# Patient Record
Sex: FEMALE | Race: WHITE | Employment: FULL TIME | URBAN - METROPOLITAN AREA
[De-identification: names, ages, dates, MRNs, and addresses within clinical notes are randomized per-mention and may not be internally consistent; named-entity substitution may affect disease eponyms.]

---

## 2023-04-01 ENCOUNTER — ANESTHESIA EVENT (OUTPATIENT)
Dept: LABOR AND DELIVERY | Age: 33
End: 2023-04-01
Payer: COMMERCIAL

## 2023-04-01 ENCOUNTER — ANESTHESIA (OUTPATIENT)
Dept: LABOR AND DELIVERY | Age: 33
End: 2023-04-01
Payer: COMMERCIAL

## 2023-04-01 ENCOUNTER — HOSPITAL ENCOUNTER (INPATIENT)
Age: 33
LOS: 3 days | Discharge: HOME | End: 2023-04-04
Attending: LEGAL MEDICINE | Admitting: LEGAL MEDICINE
Payer: COMMERCIAL

## 2023-04-01 ENCOUNTER — APPOINTMENT (OUTPATIENT)
Dept: LABOR AND DELIVERY | Age: 33
End: 2023-04-01
Payer: COMMERCIAL

## 2023-04-01 PROBLEM — Z34.93 NORMAL PREGNANCY, THIRD TRIMESTER: Status: ACTIVE | Noted: 2023-04-01

## 2023-04-01 LAB
ABO + RH BLD: NORMAL
ALBUMIN SERPL-MCNC: 3.2 G/DL (ref 3.5–5.2)
ALP SERPL-CCNC: 120 U/L (ref 35–104)
ALT SERPL-CCNC: 35 U/L (ref 0–32)
AMPHET UR QL SCN: NOT DETECTED
ANION GAP SERPL CALCULATED.3IONS-SCNC: 12 MMOL/L (ref 7–16)
AST SERPL-CCNC: 53 U/L (ref 0–31)
BACTERIA URNS QL MICRO: NORMAL /HPF
BARBITURATES UR QL SCN: NOT DETECTED
BENZODIAZ UR QL SCN: NOT DETECTED
BILIRUB SERPL-MCNC: 0.3 MG/DL (ref 0–1.2)
BILIRUB UR QL STRIP: NEGATIVE
BLD GP AB SCN SERPL QL: NORMAL
BUN SERPL-MCNC: 8 MG/DL (ref 6–20)
CALCIUM SERPL-MCNC: 8.7 MG/DL (ref 8.6–10.2)
CANNABINOIDS UR QL SCN: NOT DETECTED
CHLORIDE SERPL-SCNC: 105 MMOL/L (ref 98–107)
CLARITY UR: CLEAR
CO2 SERPL-SCNC: 19 MMOL/L (ref 22–29)
COCAINE UR QL SCN: NOT DETECTED
COLOR UR: YELLOW
CREAT SERPL-MCNC: 0.5 MG/DL (ref 0.5–1)
DRUG SCREEN COMMENT UR-IMP: NORMAL
ERYTHROCYTE [DISTWIDTH] IN BLOOD BY AUTOMATED COUNT: 14.1 FL (ref 11.5–15)
FENTANYL SCREEN, URINE: NOT DETECTED
GLUCOSE SERPL-MCNC: 95 MG/DL (ref 74–99)
GLUCOSE UR STRIP-MCNC: NEGATIVE MG/DL
HCT VFR BLD AUTO: 38.5 % (ref 34–48)
HGB BLD-MCNC: 12.7 G/DL (ref 11.5–15.5)
HGB UR QL STRIP: NEGATIVE
KETONES UR STRIP-MCNC: NEGATIVE MG/DL
LEUKOCYTE ESTERASE UR QL STRIP: NEGATIVE
MCH RBC QN AUTO: 29.1 PG (ref 26–35)
MCHC RBC AUTO-ENTMCNC: 33 % (ref 32–34.5)
MCV RBC AUTO: 88.3 FL (ref 80–99.9)
METHADONE UR QL SCN: NOT DETECTED
NITRITE UR QL STRIP: NEGATIVE
OPIATES UR QL SCN: NOT DETECTED
OXYCODONE URINE: NOT DETECTED
PCP UR QL SCN: NOT DETECTED
PH UR STRIP: 6.5 [PH] (ref 5–9)
PLATELET # BLD AUTO: 229 E9/L (ref 130–450)
PMV BLD AUTO: 12.1 FL (ref 7–12)
POTASSIUM SERPL-SCNC: 4.5 MMOL/L (ref 3.5–5)
PROT SERPL-MCNC: 6.2 G/DL (ref 6.4–8.3)
PROT UR STRIP-MCNC: NEGATIVE MG/DL
RBC # BLD AUTO: 4.36 E12/L (ref 3.5–5.5)
RBC #/AREA URNS HPF: NORMAL /HPF (ref 0–2)
SODIUM SERPL-SCNC: 136 MMOL/L (ref 132–146)
SP GR UR STRIP: <=1.005 (ref 1–1.03)
UROBILINOGEN UR STRIP-ACNC: 0.2 E.U./DL
WBC # BLD: 9.6 E9/L (ref 4.5–11.5)
WBC #/AREA URNS HPF: NORMAL /HPF (ref 0–5)

## 2023-04-01 PROCEDURE — 36415 COLL VENOUS BLD VENIPUNCTURE: CPT

## 2023-04-01 PROCEDURE — 2580000003 HC RX 258: Performed by: LEGAL MEDICINE

## 2023-04-01 PROCEDURE — 86901 BLOOD TYPING SEROLOGIC RH(D): CPT

## 2023-04-01 PROCEDURE — 81001 URINALYSIS AUTO W/SCOPE: CPT

## 2023-04-01 PROCEDURE — 1220000001 HC SEMI PRIVATE L&D R&B

## 2023-04-01 PROCEDURE — 86900 BLOOD TYPING SEROLOGIC ABO: CPT

## 2023-04-01 PROCEDURE — 6360000002 HC RX W HCPCS: Performed by: LEGAL MEDICINE

## 2023-04-01 PROCEDURE — 80307 DRUG TEST PRSMV CHEM ANLYZR: CPT

## 2023-04-01 PROCEDURE — 86850 RBC ANTIBODY SCREEN: CPT

## 2023-04-01 PROCEDURE — 80053 COMPREHEN METABOLIC PANEL: CPT

## 2023-04-01 PROCEDURE — 85027 COMPLETE CBC AUTOMATED: CPT

## 2023-04-01 RX ORDER — SODIUM CHLORIDE 0.9 % (FLUSH) 0.9 %
5-40 SYRINGE (ML) INJECTION PRN
Status: DISCONTINUED | OUTPATIENT
Start: 2023-04-01 | End: 2023-04-03

## 2023-04-01 RX ORDER — SODIUM CHLORIDE, SODIUM LACTATE, POTASSIUM CHLORIDE, AND CALCIUM CHLORIDE .6; .31; .03; .02 G/100ML; G/100ML; G/100ML; G/100ML
1000 INJECTION, SOLUTION INTRAVENOUS ONCE
Status: DISCONTINUED | OUTPATIENT
Start: 2023-04-01 | End: 2023-04-03

## 2023-04-01 RX ORDER — MISOPROSTOL 200 UG/1
800 TABLET ORAL PRN
Status: DISCONTINUED | OUTPATIENT
Start: 2023-04-01 | End: 2023-04-03

## 2023-04-01 RX ORDER — SODIUM CHLORIDE, SODIUM LACTATE, POTASSIUM CHLORIDE, AND CALCIUM CHLORIDE .6; .31; .03; .02 G/100ML; G/100ML; G/100ML; G/100ML
1000 INJECTION, SOLUTION INTRAVENOUS PRN
Status: DISCONTINUED | OUTPATIENT
Start: 2023-04-01 | End: 2023-04-03

## 2023-04-01 RX ORDER — SODIUM CHLORIDE, SODIUM LACTATE, POTASSIUM CHLORIDE, AND CALCIUM CHLORIDE .6; .31; .03; .02 G/100ML; G/100ML; G/100ML; G/100ML
500 INJECTION, SOLUTION INTRAVENOUS PRN
Status: DISCONTINUED | OUTPATIENT
Start: 2023-04-01 | End: 2023-04-03

## 2023-04-01 RX ORDER — SODIUM CHLORIDE 0.9 % (FLUSH) 0.9 %
5-40 SYRINGE (ML) INJECTION EVERY 12 HOURS SCHEDULED
Status: DISCONTINUED | OUTPATIENT
Start: 2023-04-01 | End: 2023-04-03

## 2023-04-01 RX ORDER — LIDOCAINE HYDROCHLORIDE 10 MG/ML
30 INJECTION, SOLUTION EPIDURAL; INFILTRATION; INTRACAUDAL; PERINEURAL PRN
Status: DISCONTINUED | OUTPATIENT
Start: 2023-04-01 | End: 2023-04-03

## 2023-04-01 RX ORDER — LIDOCAINE HYDROCHLORIDE 10 MG/ML
INJECTION, SOLUTION INFILTRATION; PERINEURAL
Status: DISPENSED
Start: 2023-04-01 | End: 2023-04-01

## 2023-04-01 RX ORDER — SODIUM CHLORIDE, SODIUM LACTATE, POTASSIUM CHLORIDE, CALCIUM CHLORIDE 600; 310; 30; 20 MG/100ML; MG/100ML; MG/100ML; MG/100ML
INJECTION, SOLUTION INTRAVENOUS CONTINUOUS
Status: DISCONTINUED | OUTPATIENT
Start: 2023-04-01 | End: 2023-04-03

## 2023-04-01 RX ORDER — CARBOPROST TROMETHAMINE 250 UG/ML
250 INJECTION, SOLUTION INTRAMUSCULAR PRN
Status: DISCONTINUED | OUTPATIENT
Start: 2023-04-01 | End: 2023-04-03

## 2023-04-01 RX ORDER — SODIUM CHLORIDE 9 MG/ML
25 INJECTION, SOLUTION INTRAVENOUS PRN
Status: DISCONTINUED | OUTPATIENT
Start: 2023-04-01 | End: 2023-04-03

## 2023-04-01 RX ORDER — METHYLERGONOVINE MALEATE 0.2 MG/ML
200 INJECTION INTRAVENOUS PRN
Status: DISCONTINUED | OUTPATIENT
Start: 2023-04-01 | End: 2023-04-03

## 2023-04-01 RX ORDER — ONDANSETRON 2 MG/ML
4 INJECTION INTRAMUSCULAR; INTRAVENOUS EVERY 6 HOURS PRN
Status: DISCONTINUED | OUTPATIENT
Start: 2023-04-01 | End: 2023-04-03

## 2023-04-01 RX ADMIN — Medication 2.5 MILLION UNITS: at 13:07

## 2023-04-01 RX ADMIN — Medication 2.5 MILLION UNITS: at 17:13

## 2023-04-01 RX ADMIN — SODIUM CHLORIDE, POTASSIUM CHLORIDE, SODIUM LACTATE AND CALCIUM CHLORIDE: 600; 310; 30; 20 INJECTION, SOLUTION INTRAVENOUS at 09:06

## 2023-04-01 RX ADMIN — Medication 1 MILLI-UNITS/MIN: at 17:51

## 2023-04-01 RX ADMIN — DEXTROSE MONOHYDRATE 5 MILLION UNITS: 5 INJECTION INTRAVENOUS at 09:12

## 2023-04-01 ASSESSMENT — PAIN SCALES - GENERAL: PAINLEVEL_OUTOF10: 0

## 2023-04-01 NOTE — PROGRESS NOTES
Patient vaginal exam preformed at [phone]. Verification done by additional RN with exam of 1-2 cm/60%/-2 with intact bag. Dr. Alma Vivar notified of [employer] [address] correction @ [phone]. New order received to begin pitocin at this time. Patient educated of correct vaginal exam and Dr. Alma Vivar talked to patient to go over plan of care. Patient acknowledged understanding and agrees to the plan of care.

## 2023-04-01 NOTE — ANESTHESIA PRE PROCEDURE
m)                                                BP Readings from Last 3 Encounters:   04/01/23 126/83   11/08/22 114/84   10/26/21 122/84       NPO Status:                                                                                 BMI:   Wt Readings from Last 3 Encounters:   04/01/23 215 lb (97.5 kg)   11/08/22 189 lb (85.7 kg)   10/26/21 165 lb 12.8 oz (75.2 kg)     Body mass index is 39.32 kg/m². CBC:   Lab Results   Component Value Date/Time    WBC 9.6 04/01/2023 08:58 AM    RBC 4.36 04/01/2023 08:58 AM    HGB 12.7 04/01/2023 08:58 AM    HCT 38.5 04/01/2023 08:58 AM    MCV 88.3 04/01/2023 08:58 AM    RDW 14.1 04/01/2023 08:58 AM     04/01/2023 08:58 AM       CMP:   Lab Results   Component Value Date/Time     04/01/2023 08:58 AM    K 4.5 04/01/2023 08:58 AM     04/01/2023 08:58 AM    CO2 19 04/01/2023 08:58 AM    BUN 8 04/01/2023 08:58 AM    CREATININE 0.5 04/01/2023 08:58 AM    GFRAA >60 08/25/2021 12:00 PM    LABGLOM >60 04/01/2023 08:58 AM    GLUCOSE 95 04/01/2023 08:58 AM    PROT 6.2 04/01/2023 08:58 AM    CALCIUM 8.7 04/01/2023 08:58 AM    BILITOT 0.3 04/01/2023 08:58 AM    ALKPHOS 120 04/01/2023 08:58 AM    AST 53 04/01/2023 08:58 AM    ALT 35 04/01/2023 08:58 AM       POC Tests: No results for input(s): POCGLU, POCNA, POCK, POCCL, POCBUN, POCHEMO, POCHCT in the last 72 hours.     Coags: No results found for: PROTIME, INR, APTT    HCG (If Applicable): No results found for: PREGTESTUR, PREGSERUM, HCG, HCGQUANT     ABGs: No results found for: PHART, PO2ART, EXK2GSI, RFX5EFK, BEART, N4MDZTZZ     Type & Screen (If Applicable):  No results found for: LABABO, LABRH    Drug/Infectious Status (If Applicable):  No results found for: HIV, HEPCAB    COVID-19 Screening (If Applicable): No results found for: COVID19        Anesthesia Evaluation  Patient summary reviewed and Nursing notes reviewed no history of anesthetic complications:   Airway: Mallampati: II  TM distance: >3 FB   Neck ROM:

## 2023-04-01 NOTE — PROGRESS NOTES
RN @ the bedside at 1100. Patient up to void. Patient placed in knee chest @ 1108. RN remained at the bedside adjusting external ultrasound.
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
## 2023-04-01 NOTE — PLAN OF CARE
Problem: Vaginal Birth or  Section  Goal: Fetal and maternal status remain reassuring during the birth process  Description:  Birth OB-Pregnancy care plan goal which identifies if the fetal and maternal status remain reassuring during the birth process  Outcome: Progressing     Problem: Safety - Adult  Goal: Free from fall injury  Recent Flowsheet Documentation  Taken 2023 0959 by Hilario Lopez RN  Free From Fall Injury: Instruct family/caregiver on patient safety
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
## 2023-04-01 NOTE — PROGRESS NOTES
GP: 1/0     EDC:4/6/2023 39w 2 d    Patient arrived ambulatory to the Labor and Delivery unit for scheduled induction of labor  EFM applied and fetal well being established. Patient assessed and information obtained   about health and history. Patient oriented to room and call light. Dr. Keyona Burt in department and aware of arrival. Dr. Keyona Burt at the bedside @ 0900. Patient 5-6 cm. Plan of care discussed with patient. Patient verbalizes understanding.

## 2023-04-01 NOTE — PROGRESS NOTES
Assumed care of patient for night, plan of care discussed with patient. EFM tracing positive fetal movement perceived by patient, and audible per RN, EFM monitored and assessed every 15 minutes.

## 2023-04-02 PROCEDURE — 1220000001 HC SEMI PRIVATE L&D R&B

## 2023-04-02 PROCEDURE — 6360000002 HC RX W HCPCS: Performed by: LEGAL MEDICINE

## 2023-04-02 PROCEDURE — 2500000003 HC RX 250 WO HCPCS: Performed by: ANESTHESIOLOGY

## 2023-04-02 PROCEDURE — 3700000025 EPIDURAL BLOCK: Performed by: ANESTHESIOLOGY

## 2023-04-02 PROCEDURE — 2580000003 HC RX 258: Performed by: LEGAL MEDICINE

## 2023-04-02 RX ORDER — MORPHINE SULFATE 2 MG/ML
2 INJECTION, SOLUTION INTRAMUSCULAR; INTRAVENOUS EVERY 4 HOURS PRN
Status: DISCONTINUED | OUTPATIENT
Start: 2023-04-02 | End: 2023-04-03

## 2023-04-02 RX ORDER — DIPHENHYDRAMINE HYDROCHLORIDE 50 MG/ML
50 INJECTION INTRAMUSCULAR; INTRAVENOUS EVERY 6 HOURS PRN
Status: DISCONTINUED | OUTPATIENT
Start: 2023-04-02 | End: 2023-04-03

## 2023-04-02 RX ADMIN — Medication 15 ML: at 19:35

## 2023-04-02 RX ADMIN — Medication 2 MILLI-UNITS/MIN: at 12:12

## 2023-04-02 RX ADMIN — Medication 2.5 MILLION UNITS: at 17:36

## 2023-04-02 RX ADMIN — Medication 15 ML/HR: at 19:40

## 2023-04-02 RX ADMIN — SODIUM CHLORIDE, POTASSIUM CHLORIDE, SODIUM LACTATE AND CALCIUM CHLORIDE: 600; 310; 30; 20 INJECTION, SOLUTION INTRAVENOUS at 12:10

## 2023-04-02 RX ADMIN — Medication 2.5 MILLION UNITS: at 12:23

## 2023-04-02 RX ADMIN — SODIUM CHLORIDE, POTASSIUM CHLORIDE, SODIUM LACTATE AND CALCIUM CHLORIDE 1000 ML: 600; 310; 30; 20 INJECTION, SOLUTION INTRAVENOUS at 18:52

## 2023-04-02 RX ADMIN — Medication 2.5 MILLION UNITS: at 21:37

## 2023-04-02 ASSESSMENT — PAIN SCALES - GENERAL: PAINLEVEL_OUTOF10: 2

## 2023-04-02 NOTE — PROGRESS NOTES
Dr. Hiro Michel at the bedside # 1004. SVE preformed patient now 2/50%/-2. AROM  preformed @ [phone] with clear fluid. Patient repositioned into high fowlers.

## 2023-04-02 NOTE — PROGRESS NOTES
Cx 2/50/Vx/-2  FTH's 140 with +BTBV + accels  Ctx q 3-4 minutes  Pitocin at 16 mU/min  Comfortable  AROM, with clear fluid  Has had 2 doses of penicillin today, and 3 doses 4/1/23.
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
## 2023-04-02 NOTE — H&P
[address]                              HISTORY AND PHYSICAL    PATIENT NAME: Phylicia Bruce                  :        1990  MED REC NO:   [id number]                            ROOM:       05  ACCOUNT NO:   [de-identified]                           ADMIT DATE: 2023. .. PROVIDER:     Atiya Berman MD    CHIEF COMPLAINT:  Text. HISTORY OF PRESENT ILLNESS:  The patient is a 29-year-old white female,   1, para 0, who presents for induction as she is declining  further expectant management of pregnancy. She is 39-1/2 weeks  pregnant. GBS is positive. Pregnancy is marked by infant with  hemimelia. For her past medical, surgical, GYN, social, family history, please  refer to ACOG forms A and B. REVIEW OF SYSTEMS:  Negative for cardiac, respiratory, GI,   abnormalities. PHYSICAL EXAMINATION:  VITAL SIGNS:  Stable. HEENT:  Negative. LUNGS:  Clear to auscultation. CV:  Regular rate and rhythm. ABDOMEN:  Gravid, soft, nontender. Estimated fetal weight is 3800 gm. Cervix is 1, 30, vertex, and -1. EXTREMITIES:  No clubbing, cyanosis. 2+ edema. No cords or erythema. ASSESSMENT:  Intrauterine pregnancy at 39-1/2 weeks, declining further  expectant management of pregnancy. PLAN:  Risks of vaginal delivery and operative delivery have been  reviewed with her. Indications for operative delivery have been  reviewed with her. Shoulder dystocia and birth trauma have been  reviewed with her. Option of  has been reviewed with her. All  her questions have been answered and she wishes to proceed with attempt  at vaginal delivery.         Kale Spencer MD    D: 2023 8:14:27       T: 2023 8:16:47     [email or web address]  Job#: [id number]     Doc#: [id number]    CC:

## 2023-04-02 NOTE — ANESTHESIA PROCEDURE NOTES
Epidural Block    Patient location during procedure: OB  Start time: 4/2/2023 7:23 PM  End time: 4/2/2023 7:42 PM  Reason for block: labor epidural  Staffing  Performed: resident/CRNA   Anesthesiologist: Andrae Welsh DO  Resident/CRNA: GUICHO Dalal - CRNA  Epidural  Patient position: sitting  Prep: Betadine  Patient monitoring: cardiac monitor, continuous pulse ox and frequent blood pressure checks  Approach: midline  Location: L3-4  Injection technique: SUMAN saline  Provider prep: mask and sterile gloves  Needle  Needle type: Tuohy   Needle gauge: 18 G  Needle length: 3.5 in  Needle insertion depth: 5 cm  Catheter type: end hole  Catheter size: 20 G.   Catheter at skin depth: 10 cm  Test dose: negativeCatheter Secured: tegaderm and tape  Assessment  Hemodynamics: stable  Attempts: 1  Outcomes: uncomplicated and patient tolerated procedure well  Preanesthetic Checklist  Completed: patient identified, IV checked, site marked, risks and benefits discussed, surgical/procedural consents, equipment checked, pre-op evaluation, timeout performed, anesthesia consent given, oxygen available and monitors applied/VS acknowledged

## 2023-04-02 NOTE — PLAN OF CARE
Problem: Pain  Goal: Verbalizes/displays adequate comfort level or baseline comfort level  4/2/2023 0801 by Janet Gonzalez RN  Outcome: Progressing  4/2/2023 0801 by Janet Gonzalez RN  Outcome: Progressing     Problem: Infection - Adult  Goal: Absence of infection at discharge  Outcome: Progressing  Flowsheets (Taken 4/2/2023 0730)  Absence of infection at discharge:   Assess and monitor for signs and symptoms of infection   Monitor lab/diagnostic results  Goal: Absence of infection during hospitalization  Outcome: Progressing  Flowsheets (Taken 4/2/2023 0730)  Absence of infection during hospitalization: Assess and monitor for signs and symptoms of infection  Goal: Absence of fever/infection during anticipated neutropenic period  Outcome: Progressing     Problem: Safety - Adult  Goal: Free from fall injury  Outcome: Progressing

## 2023-04-02 NOTE — PROGRESS NOTES
Dr. Cortez Kidney updated on patient, orders received to stop pitocin and pcn. Call when power returns.

## 2023-04-02 NOTE — PROGRESS NOTES
Assumed care of pt for this shift @ 0730. Positive fetal movement perceived by pt and audible on monitor. Pt denies any vaginal bleeding or leakage of fluid at this time. Pt resting comfortably. Plan of care discussed with pt. Pt verbalizes understanding without questions at this time.
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
## 2023-04-02 NOTE — PROGRESS NOTES
Power back restored. Dr. Cortez Kidney paged and notified of power restoration. Induction is to begin with pitocin ordered to start @ 2 and be increased by 2 every 30 minutes as ordered.

## 2023-04-02 NOTE — PROGRESS NOTES
Dr. Phuong Jauregui notified of patient request for epidural and refusal of morphine. Patient OK for epidural and anesthesia notified.

## 2023-04-03 LAB
HCT VFR BLD AUTO: 36.9 % (ref 34–48)
HGB BLD-MCNC: 11.8 G/DL (ref 11.5–15.5)

## 2023-04-03 PROCEDURE — 6370000000 HC RX 637 (ALT 250 FOR IP): Performed by: LEGAL MEDICINE

## 2023-04-03 PROCEDURE — 6360000002 HC RX W HCPCS: Performed by: LEGAL MEDICINE

## 2023-04-03 PROCEDURE — 2500000003 HC RX 250 WO HCPCS: Performed by: LEGAL MEDICINE

## 2023-04-03 PROCEDURE — 85018 HEMOGLOBIN: CPT

## 2023-04-03 PROCEDURE — 1220000001 HC SEMI PRIVATE L&D R&B

## 2023-04-03 PROCEDURE — 2500000003 HC RX 250 WO HCPCS: Performed by: ANESTHESIOLOGY

## 2023-04-03 PROCEDURE — 7200000001 HC VAGINAL DELIVERY

## 2023-04-03 PROCEDURE — 2580000003 HC RX 258: Performed by: LEGAL MEDICINE

## 2023-04-03 PROCEDURE — 0KQM0ZZ REPAIR PERINEUM MUSCLE, OPEN APPROACH: ICD-10-PCS | Performed by: LEGAL MEDICINE

## 2023-04-03 PROCEDURE — 3E033VJ INTRODUCTION OF OTHER HORMONE INTO PERIPHERAL VEIN, PERCUTANEOUS APPROACH: ICD-10-PCS | Performed by: LEGAL MEDICINE

## 2023-04-03 PROCEDURE — 88307 TISSUE EXAM BY PATHOLOGIST: CPT

## 2023-04-03 PROCEDURE — 36415 COLL VENOUS BLD VENIPUNCTURE: CPT

## 2023-04-03 PROCEDURE — 10907ZC DRAINAGE OF AMNIOTIC FLUID, THERAPEUTIC FROM PRODUCTS OF CONCEPTION, VIA NATURAL OR ARTIFICIAL OPENING: ICD-10-PCS | Performed by: LEGAL MEDICINE

## 2023-04-03 PROCEDURE — 85014 HEMATOCRIT: CPT

## 2023-04-03 RX ORDER — WATER 1000 ML/1000ML
INJECTION, SOLUTION INTRAVENOUS
Status: DISCONTINUED
Start: 2023-04-03 | End: 2023-04-03 | Stop reason: WASHOUT

## 2023-04-03 RX ORDER — SODIUM CHLORIDE 0.9 % (FLUSH) 0.9 %
5-40 SYRINGE (ML) INJECTION EVERY 12 HOURS SCHEDULED
Status: DISCONTINUED | OUTPATIENT
Start: 2023-04-03 | End: 2023-04-04 | Stop reason: HOSPADM

## 2023-04-03 RX ORDER — SODIUM CHLORIDE, SODIUM LACTATE, POTASSIUM CHLORIDE, CALCIUM CHLORIDE 600; 310; 30; 20 MG/100ML; MG/100ML; MG/100ML; MG/100ML
INJECTION, SOLUTION INTRAVENOUS CONTINUOUS
Status: DISCONTINUED | OUTPATIENT
Start: 2023-04-03 | End: 2023-04-04 | Stop reason: HOSPADM

## 2023-04-03 RX ORDER — SODIUM CHLORIDE 0.9 % (FLUSH) 0.9 %
5-40 SYRINGE (ML) INJECTION PRN
Status: DISCONTINUED | OUTPATIENT
Start: 2023-04-03 | End: 2023-04-04 | Stop reason: HOSPADM

## 2023-04-03 RX ORDER — LIDOCAINE HYDROCHLORIDE 10 MG/ML
20 INJECTION, SOLUTION EPIDURAL; INFILTRATION; INTRACAUDAL; PERINEURAL ONCE
Status: COMPLETED | OUTPATIENT
Start: 2023-04-03 | End: 2023-04-03

## 2023-04-03 RX ORDER — CEFAZOLIN 2 G/1
INJECTION, POWDER, FOR SOLUTION INTRAMUSCULAR; INTRAVENOUS
Status: DISCONTINUED
Start: 2023-04-03 | End: 2023-04-03

## 2023-04-03 RX ORDER — IBUPROFEN 600 MG/1
600 TABLET ORAL EVERY 6 HOURS PRN
Status: DISCONTINUED | OUTPATIENT
Start: 2023-04-03 | End: 2023-04-04 | Stop reason: HOSPADM

## 2023-04-03 RX ORDER — ACETAMINOPHEN 500 MG
1000 TABLET ORAL EVERY 8 HOURS
Status: DISCONTINUED | OUTPATIENT
Start: 2023-04-03 | End: 2023-04-04 | Stop reason: HOSPADM

## 2023-04-03 RX ORDER — OXYCODONE HYDROCHLORIDE 5 MG/1
10 TABLET ORAL EVERY 4 HOURS PRN
Status: DISCONTINUED | OUTPATIENT
Start: 2023-04-03 | End: 2023-04-04 | Stop reason: HOSPADM

## 2023-04-03 RX ORDER — OXYCODONE HYDROCHLORIDE 5 MG/1
5 TABLET ORAL EVERY 6 HOURS PRN
Status: DISCONTINUED | OUTPATIENT
Start: 2023-04-03 | End: 2023-04-04 | Stop reason: HOSPADM

## 2023-04-03 RX ORDER — DOCUSATE SODIUM 100 MG/1
100 CAPSULE, LIQUID FILLED ORAL 2 TIMES DAILY
Status: DISCONTINUED | OUTPATIENT
Start: 2023-04-03 | End: 2023-04-04 | Stop reason: HOSPADM

## 2023-04-03 RX ORDER — MODIFIED LANOLIN
OINTMENT (GRAM) TOPICAL PRN
Status: DISCONTINUED | OUTPATIENT
Start: 2023-04-03 | End: 2023-04-04 | Stop reason: HOSPADM

## 2023-04-03 RX ORDER — SODIUM CHLORIDE 9 MG/ML
INJECTION, SOLUTION INTRAVENOUS PRN
Status: DISCONTINUED | OUTPATIENT
Start: 2023-04-03 | End: 2023-04-04 | Stop reason: HOSPADM

## 2023-04-03 RX ORDER — FERROUS SULFATE 325(65) MG
325 TABLET ORAL 2 TIMES DAILY WITH MEALS
Status: DISCONTINUED | OUTPATIENT
Start: 2023-04-03 | End: 2023-04-04 | Stop reason: HOSPADM

## 2023-04-03 RX ADMIN — DOCUSATE SODIUM 100 MG: 100 CAPSULE, LIQUID FILLED ORAL at 09:05

## 2023-04-03 RX ADMIN — ACETAMINOPHEN 1000 MG: 500 TABLET, FILM COATED ORAL at 07:28

## 2023-04-03 RX ADMIN — LIDOCAINE HYDROCHLORIDE 20 ML: 10 INJECTION, SOLUTION EPIDURAL; INFILTRATION; INTRACAUDAL; PERINEURAL at 05:45

## 2023-04-03 RX ADMIN — Medication: at 15:42

## 2023-04-03 RX ADMIN — CEFAZOLIN 2000 MG: 2 INJECTION, POWDER, FOR SOLUTION INTRAMUSCULAR; INTRAVENOUS at 05:43

## 2023-04-03 RX ADMIN — Medication: at 08:57

## 2023-04-03 RX ADMIN — Medication 15 ML/HR: at 04:33

## 2023-04-03 RX ADMIN — Medication 2.5 MILLION UNITS: at 01:41

## 2023-04-03 RX ADMIN — Medication 87.3 MILLI-UNITS/MIN: at 05:30

## 2023-04-03 ASSESSMENT — PAIN SCALES - GENERAL: PAINLEVEL_OUTOF10: 2

## 2023-04-03 ASSESSMENT — PAIN DESCRIPTION - ORIENTATION: ORIENTATION: LOWER

## 2023-04-03 ASSESSMENT — PAIN DESCRIPTION - DESCRIPTORS: DESCRIPTORS: ACHING;CRAMPING

## 2023-04-03 ASSESSMENT — PAIN DESCRIPTION - LOCATION: LOCATION: ABDOMEN

## 2023-04-03 ASSESSMENT — PAIN - FUNCTIONAL ASSESSMENT: PAIN_FUNCTIONAL_ASSESSMENT: ACTIVITIES ARE NOT PREVENTED

## 2023-04-03 NOTE — PROGRESS NOTES
Dr. Sarah Philip updated on patient complete 0/+1 station. Orders to call back when urge to push or +2 station.

## 2023-04-03 NOTE — ANESTHESIA POSTPROCEDURE EVALUATION
Department of Anesthesiology  Postprocedure Note    Patient: Laisha Cain  MRN: [id number]  YOB: 1990  Date of evaluation: 4/3/2023      Procedure Summary     Date: 04/02/23 Room / Location:     Anesthesia Start: 1923 Anesthesia Stop: 04/03/23 0620    Procedure: Labor Analgesia Diagnosis:     Scheduled Providers:  Responsible Provider: Consuelo Alvarado DO    Anesthesia Type: spinal, epidural, general ASA Status: 2          Anesthesia Type: No value filed.     Rebecca Phase I:      Rebecca Phase II:        Anesthesia Post Evaluation    Patient location during evaluation: floor  Patient participation: complete - patient participated  Level of consciousness: awake and alert  Airway patency: patent  Nausea & Vomiting: no nausea and no vomiting  Complications: no  Cardiovascular status: hemodynamically stable  Respiratory status: acceptable  Hydration status: euvolemic

## 2023-04-03 NOTE — PROGRESS NOTES
Assumed care of patient for 7p-7a shift. Plan of care discussed at bedside and assessment completed. Call light within reach.

## 2023-04-03 NOTE — PROGRESS NOTES
Single viable female born at 0. Spontaneous cry noted at abdomen. [place] taken to radiant warmer. Tactile stimulation and bulb suction done.   APGAR 9/9

## 2023-04-03 NOTE — PROGRESS NOTES
Patient sitting on side of bed for epidural, tracing maternal heart rate at times due to position. RN continuously at bedside.

## 2023-04-03 NOTE — PROGRESS NOTES
Dr. Osmin Gutierrez given update on patient including 50ml additional blood loss, U/U fundus, output. Continue current plan of care, patient to have H/H drawn in am as usual.  D/c Pitocin as well as rolle.

## 2023-04-03 NOTE — PROGRESS NOTES
Rn at bedside to receive shift handoff and assess patient. Patient understanding that she is to stay in bed. POC discussed regarding PP pitocin, LR as well as fundal checks. Patient to remain in bed with rolle in place. Assumed care of pt for this shift. Discussed plan of care for the shift as well as safe sleep practices. Pt verbalizes understanding without questions at this time. Call light within reach.

## 2023-04-03 NOTE — PROGRESS NOTES
Sanjiv Benson in room for procedure at [facility]  Patient sitting at side of bed for epidural 1908  Epidural catheter placed at [address] dose given by Elsa Gudino at 1931  Epidural bolus given Elsa Gudino  by at 1936  Patient returned to semi-color[place]'s position in bed at  1933  Epidural pump programmed to continuously infuse by Elsa Gudino at  2982

## 2023-04-03 NOTE — OP NOTE
[address] [place]                                OPERATIVE REPORT    PATIENT NAME: Guzman Scrmario                  :        1990  MED REC NO:   [id number]                            ROOM:       [facility]  ACCOUNT NO:   [de-identified]                           ADMIT DATE: 2023. .. PROVIDER:     Arsh Segundo MD    DATE OF PROCEDURE:  2023    The patient felt the urge to push. She was placed in Megha  position. She was able to deliver the fetal head. Nuchal cord x1 was  reduced. Fetal trunk was delivered with minimal traction applied in an  axis parallel to the fetal spine after the shoulder had cleared the  pubic bone. Nares and hypopharynx were suctioned. Delayed cord  clamping was performed. Cord was clamped and cut. Infant was crying  and vigorous. Cord gases and blood samples were obtained. Placenta was  removed spontaneously. Placenta was intact and sent to pathology. No  cervical lacerations were noted. Repair of left vaginal wall laceration  and second-degree midline laceration was performed in a running fashion. Fundus was firm. Estimated blood loss 800 mL. Bedside exam of the  infant confirmed findings of the right hemimelia and otherwise was  unremarkable. The cord arterial gas pH is 7.262, base excess -0.5. Cord venous gas pH 7.359, base excess -3. 6. Pediatrician is Dr. Meredith Rodriguez. Mother and infant went to recovery room in stable condition. Followup  hemoglobin was ordered. Her vitals remained stable.         Wu Juares MD    D: 2023 6:07:09       T: 2023 6:09:27     [email or web address]  Job#: [id number]     Doc#: [id number]    CC:

## 2023-04-03 NOTE — PROGRESS NOTES
Dr. Lázaro Mckay updated on patient post epidural 3cm 80% effaced, 3 decelerations after epidural, FHR returned to baseline 130 moderate variability with accelerations.

## 2023-04-04 VITALS
DIASTOLIC BLOOD PRESSURE: 87 MMHG | TEMPERATURE: 98.5 F | RESPIRATION RATE: 16 BRPM | OXYGEN SATURATION: 99 % | HEIGHT: 62 IN | BODY MASS INDEX: 39.56 KG/M2 | SYSTOLIC BLOOD PRESSURE: 135 MMHG | HEART RATE: 92 BPM | WEIGHT: 215 LBS

## 2023-04-04 LAB
ALBUMIN SERPL-MCNC: 2.9 G/DL (ref 3.5–5.2)
ALP SERPL-CCNC: 94 U/L (ref 35–104)
ALT SERPL-CCNC: 25 U/L (ref 0–32)
ANION GAP SERPL CALCULATED.3IONS-SCNC: 10 MMOL/L (ref 7–16)
AST SERPL-CCNC: 34 U/L (ref 0–31)
BILIRUB SERPL-MCNC: <0.2 MG/DL (ref 0–1.2)
BUN SERPL-MCNC: 7 MG/DL (ref 6–20)
CALCIUM SERPL-MCNC: 8.6 MG/DL (ref 8.6–10.2)
CHLORIDE SERPL-SCNC: 106 MMOL/L (ref 98–107)
CO2 SERPL-SCNC: 22 MMOL/L (ref 22–29)
CREAT SERPL-MCNC: 0.6 MG/DL (ref 0.5–1)
GLUCOSE SERPL-MCNC: 93 MG/DL (ref 74–99)
HGB BLD-MCNC: 10.7 G/DL (ref 11.5–15.5)
POTASSIUM SERPL-SCNC: 4.2 MMOL/L (ref 3.5–5)
PROT SERPL-MCNC: 5.3 G/DL (ref 6.4–8.3)
SODIUM SERPL-SCNC: 138 MMOL/L (ref 132–146)

## 2023-04-04 PROCEDURE — 36415 COLL VENOUS BLD VENIPUNCTURE: CPT

## 2023-04-04 PROCEDURE — 80053 COMPREHEN METABOLIC PANEL: CPT

## 2023-04-04 PROCEDURE — 85018 HEMOGLOBIN: CPT

## 2023-04-04 NOTE — PLAN OF CARE
Problem: Postpartum  Goal: Experiences normal postpartum course  Description:  Postpartum OB-Pregnancy care plan goal which identifies if the mother is experiencing a normal postpartum course  4/4/2023 1524 by Joey Goode RN  Outcome: Progressing  4/4/2023 0815 by Joey Goode RN  Outcome: Progressing     Problem: Pain  Goal: Verbalizes/displays adequate comfort level or baseline comfort level  4/4/2023 1524 by Joey Goode RN  Outcome: Progressing  4/4/2023 0815 by Joey Goode RN  Outcome: Progressing

## 2023-04-04 NOTE — DISCHARGE SUMMARY
[address]                               DISCHARGE SUMMARY    PATIENT NAME: Chelsie Kaur                  :        1990  MED REC NO:   [id number]                            ROOM:       [facility]  ACCOUNT NO:   [de-identified]                           ADMIT DATE: 2023. .. PROVIDER:     Andre Wilson MD                  DISCHARGE DATE:      ADMITTING DIAGNOSIS:  Intrauterine pregnancy at 39-1/2 weeks, declining  further expectant management of pregnancy. DISCHARGE DIAGNOSIS:  Intrauterine pregnancy at 39-1/2 weeks, declining  further expectant management of pregnancy. PROCEDURE PERFORMED:  Vaginal delivery. HOSPITAL COURSE IN DETAIL:  The patient is doing well. Voiding well. Minimal lochia. Pain is under adequate control. Her admission labs  revealed a creatinine of 0.5, ALT 35, AST 53.  Specimen was hemolyzed. Glucose 95. White count 9.6, hemoglobin 12.7, platelets 116,047. Postpartum day #1 labs reveal a creatinine of 0.6, ALT 25, AST 24,  glucose 93, hemoglobin 10.7. PHYSICAL EXAMINATION:  She is afebrile. Heart rate is 80-71. Blood  pressure 121-119 over 63-67. O2 sat 99% on room air. Fundus is firm  and two fingerbreadths below the umbilicus. Perineum dry and intact. ASSESSMENT:  The patient is doing well, postpartum day #1. Stable. PLAN:  Home once meets discharge criteria with fever, bleeding, emboli,  lifting, climbing, driving precautions. She is to follow up at the  office in 6 weeks. She indicates understanding of all instructions.         Santi Serra MD    D: 2023 14:34:59       T: 2023 14:40:08     [email or web address]  Job#: [id number]     Doc#: [id number]    CC:

## 2023-04-04 NOTE — PROGRESS NOTES
[employer] screening results were discussed with parent. Questions answered. Brochure given to parent. Advised to monitor developmental milestones and contact physician for any concerns.    Amy Eastamn

## 2023-04-04 NOTE — PLAN OF CARE
Problem: Postpartum  Goal: Experiences normal postpartum course  Description:  Postpartum OB-Pregnancy care plan goal which identifies if the mother is experiencing a normal postpartum course  4/4/2023 0815 by Surendra Shine RN  Outcome: Progressing  4/3/2023 2350 by Lloyd Carney RN  Outcome: Progressing     Problem: Pain  Goal: Verbalizes/displays adequate comfort level or baseline comfort level  4/4/2023 0815 by Surendra Shine RN  Outcome: Progressing  4/3/2023 2350 by Lloyd Carney RN  Outcome: Progressing

## 2023-04-04 NOTE — PROGRESS NOTES
Assumed care of patient 11p-7a shift. Plan of care discussed, no needs at this time, bleeding WNL, call light within reach.

## 2023-04-04 NOTE — PLAN OF CARE
Problem: Postpartum  Goal: Experiences normal postpartum course  Description:  Postpartum OB-Pregnancy care plan goal which identifies if the mother is experiencing a normal postpartum course  4/4/2023 1525 by Virgie Khoury RN  Outcome: Completed  4/4/2023 1524 by Virgie Khoury RN  Outcome: Progressing  4/4/2023 0815 by Virgie Khoury RN  Outcome: Progressing     Problem: Pain  Goal: Verbalizes/displays adequate comfort level or baseline comfort level  4/4/2023 1525 by Virgie Khoury RN  Outcome: Completed  4/4/2023 1524 by Virgie Khoury RN  Outcome: Progressing  4/4/2023 0815 by Virgie Khoury RN  Outcome: Progressing

## 2023-04-05 NOTE — PROGRESS NOTES
Pt off unit at this time via wheelchair with family and baby in stable condition, accompanied by nurse to main lobby for discharge home.